# Patient Record
Sex: MALE | Race: OTHER | URBAN - METROPOLITAN AREA
[De-identification: names, ages, dates, MRNs, and addresses within clinical notes are randomized per-mention and may not be internally consistent; named-entity substitution may affect disease eponyms.]

---
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
## 2025-07-04 ENCOUNTER — EMERGENCY (EMERGENCY)
Facility: HOSPITAL | Age: 20
LOS: 0 days | Discharge: HOME | End: 2025-07-05
Attending: EMERGENCY MEDICINE
Payer: MEDICAID

## 2025-07-04 VITALS
TEMPERATURE: 98 F | SYSTOLIC BLOOD PRESSURE: 138 MMHG | DIASTOLIC BLOOD PRESSURE: 76 MMHG | HEART RATE: 72 BPM | RESPIRATION RATE: 16 BRPM | OXYGEN SATURATION: 98 %

## 2025-07-04 DIAGNOSIS — H57.13 OCULAR PAIN, BILATERAL: ICD-10-CM
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
## 2025-07-04 DIAGNOSIS — S00.81XA ABRASION OF OTHER PART OF HEAD, INITIAL ENCOUNTER: ICD-10-CM

## 2025-07-04 DIAGNOSIS — S05.01XA INJURY OF CONJUNCTIVA AND CORNEAL ABRASION WITHOUT FOREIGN BODY, RIGHT EYE, INITIAL ENCOUNTER: ICD-10-CM

## 2025-07-04 DIAGNOSIS — R51.9 HEADACHE, UNSPECIFIED: ICD-10-CM
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
## 2025-07-04 DIAGNOSIS — Z90.49 ACQUIRED ABSENCE OF OTHER SPECIFIED PARTS OF DIGESTIVE TRACT: Chronic | ICD-10-CM

## 2025-07-04 DIAGNOSIS — S05.02XA INJURY OF CONJUNCTIVA AND CORNEAL ABRASION WITHOUT FOREIGN BODY, LEFT EYE, INITIAL ENCOUNTER: ICD-10-CM

## 2025-07-04 PROCEDURE — 99284 EMERGENCY DEPT VISIT MOD MDM: CPT | Mod: 25

## 2025-07-04 PROCEDURE — 99283 EMERGENCY DEPT VISIT LOW MDM: CPT

## 2025-07-04 RX ORDER — ERYTHROMYCIN 5 MG/G
1 OINTMENT OPHTHALMIC ONCE
Refills: 0 | Status: COMPLETED | OUTPATIENT
Start: 2025-07-04 | End: 2025-07-04

## 2025-07-04 RX ORDER — ERYTHROMYCIN 5 MG/G
1 OINTMENT OPHTHALMIC
Qty: 1 | Refills: 0
Start: 2025-07-04 | End: 2025-07-10
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
## 2025-07-04 NOTE — ED PROVIDER NOTE - PATIENT PORTAL LINK FT
You can access the FollowMyHealth Patient Portal offered by [facility] by registering at the following website: [email or web address]. By joining [facility]’s FollowMyHealth portal, you will also be able to view your health information using other applications (apps) compatible with our system.

## 2025-07-04 NOTE — ED ADULT NURSE NOTE - NSFALLRISKINTERV_ED_ALL_ED
Assistance OOB with selected safe patient handling equipment if applicable/Assistance with ambulation/Communicate fall risk and risk factors to all staff, patient, and family/Monitor gait and stability/Provide visual cue: yellow wristband, yellow gown, etc/Reinforce activity limits and safety measures with patient and family/Call bell, personal items and telephone in reach/Instruct patient to call for assistance before getting out of bed/chair/stretcher/Non-slip footwear applied when patient is off stretcher/[place] to call system/Physically safe environment - no spills, clutter or unnecessary equipment/Purposeful Proactive Rounding/Room/bathroom lighting operational, light cord in reach
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
## 2025-07-04 NOTE — ED PROVIDER NOTE - NSFOLLOWUPINSTRUCTIONS_ED_ALL_ED_FT
[facility]  Address: [address] [place] [address], [place] [address]  Phone: [phone]    Corneal Abrasion  An eye showing the cornea and eyelid.   A corneal abrasion is a scratch or injury to the clear covering over the front of the eye (cornea). This can be painful. A corneal abrasion heals fast when it is treated. If this problem is not treated, it can get infected or affect eyesight (vision).    What are the causes?  A poke to the eye.  Something gritty or irritating in the eye.  Too much eye rubbing.  Very dry eyes.  Some eye infections.  Contact lenses that do not fit right or are worn for too long. You can also injure your cornea when putting in contact lenses or taking them out.  Eye surgery.  Certain cornea problems. These may make you more likely to get a corneal abrasion.  Sometimes, the cause is not known.    What are the signs or symptoms?  Eye pain. The pain may get worse when you open, close, or move your eye.  A feeling that something is poking your eye.  A feeling that something is stuck in your eye.  Tearing, redness, and sensitivity to light.  Having trouble keeping your eye open, or not being able to keep it open.  Blurry vision.  Headache.  How is this diagnosed?  This condition may be diagnosed based on your medical history, symptoms, and an eye exam. You may need to see an eye doctor (ophthalmologist or optometrist).    Before the eye exam, eye drops may be given to numb your eye. You may also get dye put in your eye. This helps the eye doctor see the injury better. After any drops or dye is put in the eye, the doctor will use a light or eye scope to diagnose the scratch or injury.    How is this treated?  Washing out your eye.  Taking out anything that is stuck in your eye.  Using antibiotic drops or ointment to treat or prevent an infection.  Using eye drops to lessen irritation, swelling, and pain.  Using steroid drops or ointment to treat redness, irritation, or swelling.  Applying a cold, wet cloth (cold compress) or ice pack to help with pain.  Taking pain medicines. This may include over-the-counter medicines like ibuprofen or stronger pain medicine like an opioid.  For large injuries or scratches, an eye patch or bandage soft contact lens might also be used. A bandage soft contact lens protects the cornea while it heals. These are not used if the injury was from wearing contact lenses. This is because you may be more likely to get an eye infection.    Follow these instructions at home:  Medicines    If you were prescribed antibiotic drops or ointment, use them as told by your doctor. You may be told to use:  Antibiotic eye drops or ointment. Do not stop using them even if you start to feel better.  Eye drops that moisten the eye (lubricating eye drops).  You may need to take these actions to prevent or treat trouble pooping (constipation):  Drink enough fluid to keep your pee (urine) pale yellow.  Take over-the-counter or prescription medicines.  Eat foods that are high in fiber. These include beans, whole grains, and fresh fruits and vegetables.  Limit foods that are high in fat and sugar. These include fried or sweet foods.  Ask your doctor if you should avoid driving or using machines while you are taking your medicine.  Take over-the-counter and prescription medicines only as told by your doctor.  Eye care    Rest your eyes.  Avoid bright light and intensely using (straining) your eyes. Wear sunglasses.  Do not rub or touch your eye. Do not wash out your eye.  Ask your doctor if you can use a cold compress on your eye to lessen pain.  If you have an eye patch:  Wear it as told by your doctor.  Follow your doctor's instructions about when to take it off.  If you have a bandage soft contact lens, your doctor will take it off during your next visit.  Do not wear your own contact lenses until your doctor says it is okay.  General instructions    Do not drive or use machines as told by your doctor. You may not be able to  distances as well if your eyesight is affected or if you are wearing an eye patch.  Keep all follow-up visits to help prevent infection and loss of eyesight.  Contact a doctor if:  You keep having eye pain and other symptoms for more than 2–3 days.  You have new symptoms, such as more redness, watery eyes, or fluid (discharge) coming from your eye.  Your eyelids get swollen.  Your eyesight gets much worse.  The fluid from your eye makes your eyelids stick together in the morning.  Your eye patch becomes so loose that you can blink your eye.  Symptoms come back after your eye heals.  Get help right away if:  You have very bad eye pain that does not get better with medicine.  You lose your eyesight.  This information is not intended to replace advice given to you by your health care provider. Make sure you discuss any questions you have with your health care provider.    Document Revised: 01/04/2024 Document Reviewed: 01/04/2024  [employer] Patient Education © 2025 [employer]  [employer] logo  Terms and Conditions  Privacy Policy  Editorial Policy  All content on this site: Copyright © 2025 [employer], its licensors, and contributors. All rights are reserved, including those for text and data mining, AI training, and similar technologies. For all open access content, the Creative Commons licensing terms apply.  Cookies are used by this site. To decline or learn more, visit our Cookies page.

## 2025-07-04 NOTE — ED PROVIDER NOTE - CLINICAL SUMMARY MEDICAL DECISION MAKING FREE TEXT BOX
Eyes were flushed with normal saline, multiple corneal abrasions, lids everted, no foreign bodies noted, however did remove some powder.  Visual acuity was 20/20 bilaterally. No signs of open globe, Okay for discharge home at this time recommend supportive care, Motrin for pain as needed, erythromycin ointment given in department, sent to pharmacy.  Recommend close outpatient follow-up with ophthalmology ASAP.  Strict return precautions given for any worsening.  Patient verbalized understanding agrees plan.
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
## 2025-07-04 NOTE — ED PROVIDER NOTE - PHYSICAL EXAMINATION
GEN: no acute distress  EYES: Pupils equal round and reactive light accommodation.  Bilateral eyes with multiple corneal abrasion, speckled appearance, on fluorescein staining, Shauna negative, no hyphema, lids are unremarkable.  RESP: No resp distress. No accessory muscle use. Speaking in full sentences.  MUSC: FROM to all extremities without gait disturbance.  EXT: No edema, contractures or noted deformities.  SKIN: Forehead and facial skin with multiple abrasions,.

## 2025-07-05 RX ADMIN — ERYTHROMYCIN 1 APPLICATION(S): 5 OINTMENT OPHTHALMIC at 00:11

## 2025-07-07 ENCOUNTER — OFFICE (OUTPATIENT)
Dept: URBAN - METROPOLITAN AREA CLINIC 102 | Facility: CLINIC | Age: 20
Setting detail: OPHTHALMOLOGY
End: 2025-07-07
Payer: COMMERCIAL

## 2025-07-07 DIAGNOSIS — H16.143: ICD-10-CM

## 2025-07-07 PROCEDURE — 92014 COMPRE OPH EXAM EST PT 1/>: CPT | Performed by: OPHTHALMOLOGY

## 2025-07-07 ASSESSMENT — KERATOMETRY
OS_AXISANGLE_DEGREES: 089
OD_K1POWER_DIOPTERS: 41.25
OS_K1POWER_DIOPTERS: 41.75
OS_K2POWER_DIOPTERS: 42.75
OD_K2POWER_DIOPTERS: 42.50
METHOD_AUTO_MANUAL: AUTO
OD_AXISANGLE_DEGREES: 077

## 2025-07-07 ASSESSMENT — VISUAL ACUITY
OS_BCVA: 20/40-2
OD_BCVA: 20/20

## 2025-07-07 ASSESSMENT — REFRACTION_AUTOREFRACTION
OS_CYLINDER: -0.50
OD_AXIS: 081
OD_CYLINDER: -0.50
OS_SPHERE: 0.00
OS_AXIS: 169
OD_SPHERE: -0.50

## 2025-07-07 ASSESSMENT — CONFRONTATIONAL VISUAL FIELD TEST (CVF)
OD_FINDINGS: FULL
OS_FINDINGS: FULL